# Patient Record
Sex: FEMALE | Race: WHITE | NOT HISPANIC OR LATINO
[De-identification: names, ages, dates, MRNs, and addresses within clinical notes are randomized per-mention and may not be internally consistent; named-entity substitution may affect disease eponyms.]

---

## 2022-02-27 ENCOUNTER — TRANSCRIPTION ENCOUNTER (OUTPATIENT)
Age: 27
End: 2022-02-27

## 2022-02-28 ENCOUNTER — INPATIENT (INPATIENT)
Facility: HOSPITAL | Age: 27
LOS: 0 days | Discharge: HOME | DRG: 417 | End: 2022-02-28
Attending: STUDENT IN AN ORGANIZED HEALTH CARE EDUCATION/TRAINING PROGRAM | Admitting: STUDENT IN AN ORGANIZED HEALTH CARE EDUCATION/TRAINING PROGRAM
Payer: COMMERCIAL

## 2022-02-28 ENCOUNTER — RESULT REVIEW (OUTPATIENT)
Age: 27
End: 2022-02-28

## 2022-02-28 VITALS
DIASTOLIC BLOOD PRESSURE: 49 MMHG | TEMPERATURE: 98 F | OXYGEN SATURATION: 100 % | SYSTOLIC BLOOD PRESSURE: 105 MMHG | RESPIRATION RATE: 19 BRPM | HEART RATE: 94 BPM

## 2022-02-28 VITALS
SYSTOLIC BLOOD PRESSURE: 130 MMHG | RESPIRATION RATE: 16 BRPM | WEIGHT: 190.04 LBS | HEIGHT: 62 IN | HEART RATE: 89 BPM | TEMPERATURE: 98 F | OXYGEN SATURATION: 97 % | DIASTOLIC BLOOD PRESSURE: 87 MMHG

## 2022-02-28 DIAGNOSIS — K80.21 CALCULUS OF GALLBLADDER WITHOUT CHOLECYSTITIS WITH OBSTRUCTION: ICD-10-CM

## 2022-02-28 LAB
ALBUMIN SERPL ELPH-MCNC: 4.2 G/DL — SIGNIFICANT CHANGE UP (ref 3.3–5.2)
ALP SERPL-CCNC: 71 U/L — SIGNIFICANT CHANGE UP (ref 40–120)
ALT FLD-CCNC: 32 U/L — SIGNIFICANT CHANGE UP
ANION GAP SERPL CALC-SCNC: 15 MMOL/L — SIGNIFICANT CHANGE UP (ref 5–17)
AST SERPL-CCNC: 27 U/L — SIGNIFICANT CHANGE UP
BASOPHILS # BLD AUTO: 0.03 K/UL — SIGNIFICANT CHANGE UP (ref 0–0.2)
BASOPHILS NFR BLD AUTO: 0.3 % — SIGNIFICANT CHANGE UP (ref 0–2)
BILIRUB SERPL-MCNC: 0.3 MG/DL — LOW (ref 0.4–2)
BLD GP AB SCN SERPL QL: SIGNIFICANT CHANGE UP
BUN SERPL-MCNC: 13.8 MG/DL — SIGNIFICANT CHANGE UP (ref 8–20)
CALCIUM SERPL-MCNC: 9.7 MG/DL — SIGNIFICANT CHANGE UP (ref 8.6–10.2)
CHLORIDE SERPL-SCNC: 100 MMOL/L — SIGNIFICANT CHANGE UP (ref 98–107)
CO2 SERPL-SCNC: 18 MMOL/L — LOW (ref 22–29)
CREAT SERPL-MCNC: 0.66 MG/DL — SIGNIFICANT CHANGE UP (ref 0.5–1.3)
EOSINOPHIL # BLD AUTO: 0.15 K/UL — SIGNIFICANT CHANGE UP (ref 0–0.5)
EOSINOPHIL NFR BLD AUTO: 1.4 % — SIGNIFICANT CHANGE UP (ref 0–6)
GLUCOSE SERPL-MCNC: 110 MG/DL — HIGH (ref 70–99)
HCG SERPL-ACNC: <4 MIU/ML — SIGNIFICANT CHANGE UP
HCT VFR BLD CALC: 42.9 % — SIGNIFICANT CHANGE UP (ref 34.5–45)
HGB BLD-MCNC: 14.6 G/DL — SIGNIFICANT CHANGE UP (ref 11.5–15.5)
IMM GRANULOCYTES NFR BLD AUTO: 0.4 % — SIGNIFICANT CHANGE UP (ref 0–1.5)
LIDOCAIN IGE QN: 29 U/L — SIGNIFICANT CHANGE UP (ref 22–51)
LYMPHOCYTES # BLD AUTO: 2.42 K/UL — SIGNIFICANT CHANGE UP (ref 1–3.3)
LYMPHOCYTES # BLD AUTO: 21.9 % — SIGNIFICANT CHANGE UP (ref 13–44)
MCHC RBC-ENTMCNC: 29.7 PG — SIGNIFICANT CHANGE UP (ref 27–34)
MCHC RBC-ENTMCNC: 34 GM/DL — SIGNIFICANT CHANGE UP (ref 32–36)
MCV RBC AUTO: 87.2 FL — SIGNIFICANT CHANGE UP (ref 80–100)
MONOCYTES # BLD AUTO: 0.54 K/UL — SIGNIFICANT CHANGE UP (ref 0–0.9)
MONOCYTES NFR BLD AUTO: 4.9 % — SIGNIFICANT CHANGE UP (ref 2–14)
NEUTROPHILS # BLD AUTO: 7.87 K/UL — HIGH (ref 1.8–7.4)
NEUTROPHILS NFR BLD AUTO: 71.1 % — SIGNIFICANT CHANGE UP (ref 43–77)
PLATELET # BLD AUTO: 298 K/UL — SIGNIFICANT CHANGE UP (ref 150–400)
POTASSIUM SERPL-MCNC: 4.2 MMOL/L — SIGNIFICANT CHANGE UP (ref 3.5–5.3)
POTASSIUM SERPL-SCNC: 4.2 MMOL/L — SIGNIFICANT CHANGE UP (ref 3.5–5.3)
PROT SERPL-MCNC: 7 G/DL — SIGNIFICANT CHANGE UP (ref 6.6–8.7)
RBC # BLD: 4.92 M/UL — SIGNIFICANT CHANGE UP (ref 3.8–5.2)
RBC # FLD: 12.8 % — SIGNIFICANT CHANGE UP (ref 10.3–14.5)
SARS-COV-2 RNA SPEC QL NAA+PROBE: DETECTED
SODIUM SERPL-SCNC: 132 MMOL/L — LOW (ref 135–145)
WBC # BLD: 11.05 K/UL — HIGH (ref 3.8–10.5)
WBC # FLD AUTO: 11.05 K/UL — HIGH (ref 3.8–10.5)

## 2022-02-28 PROCEDURE — 99285 EMERGENCY DEPT VISIT HI MDM: CPT

## 2022-02-28 PROCEDURE — 76705 ECHO EXAM OF ABDOMEN: CPT

## 2022-02-28 PROCEDURE — 86900 BLOOD TYPING SEROLOGIC ABO: CPT

## 2022-02-28 PROCEDURE — 47562 LAPAROSCOPIC CHOLECYSTECTOMY: CPT

## 2022-02-28 PROCEDURE — 83690 ASSAY OF LIPASE: CPT

## 2022-02-28 PROCEDURE — 36415 COLL VENOUS BLD VENIPUNCTURE: CPT

## 2022-02-28 PROCEDURE — 76705 ECHO EXAM OF ABDOMEN: CPT | Mod: 26,RT

## 2022-02-28 PROCEDURE — 88304 TISSUE EXAM BY PATHOLOGIST: CPT | Mod: 26

## 2022-02-28 PROCEDURE — U0003: CPT

## 2022-02-28 PROCEDURE — C1889: CPT

## 2022-02-28 PROCEDURE — U0005: CPT

## 2022-02-28 PROCEDURE — 85025 COMPLETE CBC W/AUTO DIFF WBC: CPT

## 2022-02-28 PROCEDURE — 96375 TX/PRO/DX INJ NEW DRUG ADDON: CPT

## 2022-02-28 PROCEDURE — 88304 TISSUE EXAM BY PATHOLOGIST: CPT

## 2022-02-28 PROCEDURE — 84702 CHORIONIC GONADOTROPIN TEST: CPT

## 2022-02-28 PROCEDURE — 86901 BLOOD TYPING SEROLOGIC RH(D): CPT

## 2022-02-28 PROCEDURE — 86850 RBC ANTIBODY SCREEN: CPT

## 2022-02-28 PROCEDURE — 80053 COMPREHEN METABOLIC PANEL: CPT

## 2022-02-28 PROCEDURE — 96374 THER/PROPH/DIAG INJ IV PUSH: CPT

## 2022-02-28 DEVICE — CLIP APPLIER COVIDIEN ENDOCLIP III 5MM: Type: IMPLANTABLE DEVICE | Status: FUNCTIONAL

## 2022-02-28 RX ORDER — ONDANSETRON 8 MG/1
4 TABLET, FILM COATED ORAL ONCE
Refills: 0 | Status: DISCONTINUED | OUTPATIENT
Start: 2022-02-28 | End: 2022-02-28

## 2022-02-28 RX ORDER — ONDANSETRON 8 MG/1
4 TABLET, FILM COATED ORAL ONCE
Refills: 0 | Status: COMPLETED | OUTPATIENT
Start: 2022-02-28 | End: 2022-02-28

## 2022-02-28 RX ORDER — KETOROLAC TROMETHAMINE 30 MG/ML
15 SYRINGE (ML) INJECTION ONCE
Refills: 0 | Status: DISCONTINUED | OUTPATIENT
Start: 2022-02-28 | End: 2022-02-28

## 2022-02-28 RX ORDER — IBUPROFEN 200 MG
600 TABLET ORAL EVERY 6 HOURS
Refills: 0 | Status: DISCONTINUED | OUTPATIENT
Start: 2022-02-28 | End: 2022-02-28

## 2022-02-28 RX ORDER — SODIUM CHLORIDE 9 MG/ML
1000 INJECTION, SOLUTION INTRAVENOUS
Refills: 0 | Status: DISCONTINUED | OUTPATIENT
Start: 2022-02-28 | End: 2022-02-28

## 2022-02-28 RX ORDER — MORPHINE SULFATE 50 MG/1
4 CAPSULE, EXTENDED RELEASE ORAL
Refills: 0 | Status: DISCONTINUED | OUTPATIENT
Start: 2022-02-28 | End: 2022-02-28

## 2022-02-28 RX ORDER — ACETAMINOPHEN 500 MG
975 TABLET ORAL EVERY 6 HOURS
Refills: 0 | Status: DISCONTINUED | OUTPATIENT
Start: 2022-02-28 | End: 2022-02-28

## 2022-02-28 RX ORDER — OXYCODONE HYDROCHLORIDE 5 MG/1
5 TABLET ORAL EVERY 4 HOURS
Refills: 0 | Status: DISCONTINUED | OUTPATIENT
Start: 2022-02-28 | End: 2022-02-28

## 2022-02-28 RX ADMIN — SODIUM CHLORIDE 110 MILLILITER(S): 9 INJECTION, SOLUTION INTRAVENOUS at 07:51

## 2022-02-28 RX ADMIN — MORPHINE SULFATE 4 MILLIGRAM(S): 50 CAPSULE, EXTENDED RELEASE ORAL at 14:34

## 2022-02-28 RX ADMIN — OXYCODONE HYDROCHLORIDE 5 MILLIGRAM(S): 5 TABLET ORAL at 15:32

## 2022-02-28 RX ADMIN — Medication 15 MILLIGRAM(S): at 05:44

## 2022-02-28 RX ADMIN — ONDANSETRON 4 MILLIGRAM(S): 8 TABLET, FILM COATED ORAL at 05:44

## 2022-02-28 RX ADMIN — Medication 15 MILLIGRAM(S): at 07:43

## 2022-02-28 RX ADMIN — MORPHINE SULFATE 4 MILLIGRAM(S): 50 CAPSULE, EXTENDED RELEASE ORAL at 14:04

## 2022-02-28 NOTE — H&P ADULT - ASSESSMENT
ASSESSMENT: Patient is a 26y old female with intractable biliary colic    PLAN:    - Admit to [facility] floor  - NPO/IVF  - Added on for OR  - pain control  - DVT ppx  - OOB/ambulate  - strict I/Os  - Plan discussed with Attending, Dr. Garcia

## 2022-02-28 NOTE — ED ADULT NURSE NOTE - ISOLATION TYPE:
Alert and oriented to person, place and time. Normal mood and affect, no apparent risk to self or others None Alert and orientedNormal mood and affect, no apparent risk to self or others

## 2022-02-28 NOTE — H&P ADULT - NSHPPHYSICALEXAM_GEN_ALL_CORE
--------------------------------------------------------------------------------------------  Vital Signs Last 24 Hrs  T(C): 36.8 (28 Feb 2022 07:22), Max: 36.8 (28 Feb 2022 07:22)  T(F): 98.2 (28 Feb 2022 07:22), Max: 98.2 (28 Feb 2022 07:22)  HR: 82 (28 Feb 2022 07:22) (82 - 89)  BP: 106/56 (28 Feb 2022 07:22) (106/56 - 130/87)  BP(mean): --  RR: 16 (28 Feb 2022 04:59) (16 - 16)  SpO2: 97% (28 Feb 2022 04:59) (97% - 97%)      PHYSICAL EXAM:   General: NAD, Lying in bed comfortably  Neuro: A+Ox3  HEENT: MMM  Cardio: RRR  Resp: Non labored breathing on RA  GI/Abd: Soft, nondistended, tender to palpation along upper abdomen, most tender in epigastrium, no rebound/guarding, no masses palpated  Vascular: All 4 extremities warm and well perfused.     --------------------------------------------------------------------------------------------

## 2022-02-28 NOTE — ED ADULT NURSE REASSESSMENT NOTE - NS ED NURSE REASSESS COMMENT FT1
p aaozx3 breathing on ra no sob no cp pt being evaluated for abd pain pt waiting for surgical consult lr infusing

## 2022-02-28 NOTE — ED PROVIDER NOTE - PROGRESS NOTE DETAILS
ANN Rosario: Still with pain. Deferred any other medication. Made aware of results. Surgery consulted.

## 2022-02-28 NOTE — ED PROVIDER NOTE - ATTENDING CONTRIBUTION TO CARE
Arianne: I performed a face to face bedside interview with patient regarding history of present illness, review of symptoms and past medical history. I completed an independent physical exam.  I have discussed patient's plan of care with advanced care provider.   I agree with note as stated above including HISTORY OF PRESENT ILLNESS, HIV, PAST MEDICAL/SURGICAL/FAMILY/SOCIAL HISTORY, ALLERGIES AND HOME MEDICATIONS, REVIEW OF SYSTEMS, PHYSICAL EXAM, MEDICAL DECISION MAKING and any PROGRESS NOTES during the time I functioned as the attending physician for this patient  unless otherwise noted. My brief assessment is as follows: Arianne CERVANTES: 25 yo female PMHx gallstones, LMP: 2/1 presents to ED c/o epigastric pain x2 hours. Associated with nausea without vomiting. Labs WNL, US with stone in gallbladder neck. Surgery consulted. Patient signed out by me pending surgery eval.

## 2022-02-28 NOTE — ED PROVIDER NOTE - CLINICAL SUMMARY MEDICAL DECISION MAKING FREE TEXT BOX
25 yo female PMHx gallstones, LMP: 2/1 presents to ED c/o epigastric pain x2 hours. Associated with nausea without vomiting. Labs WNL, US with stone in gallbladder neck. Surgery consulted. Arianne CERVANTES: 27 yo female PMHx gallstones, LMP: 2/1 presents to ED c/o epigastric pain x2 hours. Associated with nausea without vomiting. Labs WNL, US with stone in gallbladder neck. Surgery consulted. Patient signed out by me pending surgery eval.

## 2022-02-28 NOTE — ASU DISCHARGE PLAN (ADULT/PEDIATRIC) - NS MD DC FALL RISK RISK
For information on Fall & Injury Prevention, visit: [email or web address] OR  [email or web address] OR  https://www.cdc.gov/steadi/patient.html

## 2022-02-28 NOTE — ED PROVIDER NOTE - PHYSICAL EXAMINATION
General: In NAD, non-toxic appearing; well nourished/developed.  Skin: No rashes or lesions. Warm, dry, color normal for race. No jaundice appreciated.   Head: Normocephalic/atraumatic.  Eyes: Sclera anicteric, conjunctivae clear b/l. No discharge. PERRLA, EOMI.  Neck: Supple, FROM.   Resp: Breath sounds vesicular, symmetrical and without rales, rhonchi or wheezing b/l.  Abd: Non-distended. Soft, epigastric/RUQ TTP, no masses palpated. No rebound, guarding. No McBurney's point tenderness. Negative Borrero's sign.   MSK: MAEx4. FROM.   Neuro: A&Ox3. No motor/sensory deficits.

## 2022-02-28 NOTE — ASU DISCHARGE PLAN (ADULT/PEDIATRIC) - CARE PROVIDER_API CALL
Dago Garcia)  Surgery  General  [address] [place] [id number]  Phone: [phone]  Fax: [phone]  Follow Up Time: 2 weeks

## 2022-02-28 NOTE — BRIEF OPERATIVE NOTE - OPERATION/FINDINGS
Attempted to enter abdomen in supraumbilical incision but entered falciform ligament. Successfully entered abdomen with Optiview at Guardado's point, then upsized to 12mm. Diagnostic laparoscopy without significant findings. Gallbladder dissected from gallbladder fossa. Gallbladder wall injured with spillage of bile. Bile suctioned from wall injury and abdomen. Cystic duct identified and dissected. Clipped and cut. Had difficulty identifying the cystic artery. Structure initially thought to be artery turned out not to be. Dissected further until cystic artery identified. Artery clipped and transected. Gallbladder removed from cystic plate. Abdomen irrigated, hemostasis confirmed at conclusion of case. 12mm port site closed with vicryl and skin closed with monocryl.

## 2022-02-28 NOTE — ED PROVIDER NOTE - OBJECTIVE STATEMENT
27 yo female PMHx gallstones, LMP: 2/1 presents to ED c/o epigastric pain x2 hours. Sudden in onset, sharp, radiates around back. Associated with chest pain and nausea. Had fried chicken roll for dinner at 9pm. Self medicated with Pepcid PTA. Had US ordered by PCP, followed with GI specialist last week and has next follow up appointment Tuesday. No further complaints at this time.   Denies abdominal surgeries, fevers, SOB, v/d.

## 2022-02-28 NOTE — H&P ADULT - HISTORY OF PRESENT ILLNESS
HPI: 26y Female present to ED with abdominal pain. Patient states her pain started suddenly today at 3AM. Reports pain is very severe in epigastrium, but cannot give numerical rating. Pain also experienced in the back. Associated with N, chills and sweating during pain episodes. No vomiting, or fevers. States today her pain was also associated with chest pain and difficulty breathing but she attributes this to high anxiety and her symptoms have resolved now. She states her pain started initially in Dec 2020 was short lived and did not recur for 4-5 mos. Since that time, it has become increasingly frequent. Her prior episode was last week. She states she went to her PCP earlier this month and they did an US which showed stones. She was referred to GI for further evaluation. Has not     Consult called at: 0652  Consult seen at: 0658    ROS: 10-system review is otherwise negative except HPI above.      PAST MEDICAL & SURGICAL HISTORY:  Gallstones      FAMILY HISTORY:    Family history not pertinent as reviewed with the patient.    SOCIAL HISTORY:  Denies any toxic habits    ALLERGIES: NKA No Known Allergies

## 2022-02-28 NOTE — ASU DISCHARGE PLAN (ADULT/PEDIATRIC) - ASU DC SPECIAL INSTRUCTIONSFT
Wound care: Your wound has skin glue, which will fall off over the next week.  It is normal for there to be some bruising which can take up to 3-4 weeks to resolve.    Showering: You may shower starting tomorrow. Wash the incision with soapy water and rinse. Pat dry. Do not rub or scrub the incision.     Pain control: You should take Tylenol (daily maximum 4000mg) and/or Motrin every 6 hours for the next 24 hours and then as needed for pain. You can use a warm or cool compress for comfort as needed.

## 2022-02-28 NOTE — ED ADULT NURSE NOTE - OBJECTIVE STATEMENT
Assumed care of pt at 06:00. Pt A&Ox3 c/o URQ abdominal pain x2 years. Pt states she has experienced intermittent adnominal pain, was advised by her mother to go to the ED when she experienced the pain. Pt denies N/V/D/, endorses PMH of gallstones, currently being followed by GI on outpatient basis, endorses taking PO Famotidine PTA.

## 2022-02-28 NOTE — H&P ADULT - NSHPLABSRESULTS_GEN_ALL_CORE
LABS                 14.6   11.05  )----------(  298       ( 28 Feb 2022 05:39 )               42.9      132    |  100    |  13.8   ----------------------------<  110        ( 28 Feb 2022 05:39 )  4.2     |  18.0   |  0.66     Ca    9.7        ( 28 Feb 2022 05:39 )    TPro  7.0    /  Alb  4.2    /  TBili  0.3    /  DBili  x      /  AST  27     /  ALT  32     /  AlkPhos  71     ( 28 Feb 2022 05:39 )    LIVER FUNCTIONS - ( 28 Feb 2022 05:39 )  Alb: 4.2 g/dL / Pro: 7.0 g/dL / ALK PHOS: 71 U/L / ALT: 32 U/L / AST: 27 U/L / GGT: x             --------------------------------------------------------------------------------------------      IMAGING  < from: US Abdomen Upper Quadrant Right (02.28.22 @ 06:27) >    FINDINGS:    Liver: Hepatic steatosis  Bile ducts: Normal caliber. Common bile duct measures 3 mm.  Gallbladder: Gallstone impacted in the gallbladder neck. The gallbladder   is partially distended, with mural thicknessmeasuring the upper limit of   normal at 3 mm.  Pancreas: Sonographically unremarkable where visualized.  Right kidney: 10.4 cm. No hydronephrosis.  Ascites: None.  IVC: Visualized portions are normal in caliber.    IMPRESSION:      1. Gallstone impacted in the gallbladder neck, consider nuclear medicine   HIDA scan to evaluate for cystic duct obstruction.    < end of copied text >

## 2022-02-28 NOTE — ED ADULT TRIAGE NOTE - CHIEF COMPLAINT QUOTE
Ambulatory BIBEMS fro RUQ/epigastric pain, PMH gall stones. Reports nausea but denies vomiting/diarrhea/urinary complications.

## 2022-02-28 NOTE — H&P ADULT - ATTENDING COMMENTS
Seen and examined.   Patient reports already did research on topic. Went on to discuss surgical plan of laparoscopic, possible open, cholecystectomy. Discussed benefit of likely eliminating symptoms attributed to biliary colic, as well potential cholelithiasis sequelae - such as choledocholithiasis, cholangitis, hepatic injury, pancreatitis.   Also discussed risks of bleeding, infection, potential injury to intraabdominal structures.     Patient consents to proceed with procedure.

## 2022-03-04 NOTE — CHART NOTE - NSCHARTNOTEFT_GEN_A_CORE
Please note upon review of the chart the patient was found to have a diagnosis of COVID 19 infection based on PCR.

## 2022-03-07 PROBLEM — K80.20 CALCULUS OF GALLBLADDER WITHOUT CHOLECYSTITIS WITHOUT OBSTRUCTION: Chronic | Status: ACTIVE | Noted: 2022-02-28

## 2022-03-08 LAB — SURGICAL PATHOLOGY STUDY: SIGNIFICANT CHANGE UP

## 2022-03-14 PROBLEM — Z00.00 ENCOUNTER FOR PREVENTIVE HEALTH EXAMINATION: Status: ACTIVE | Noted: 2022-03-14

## 2022-03-15 ENCOUNTER — APPOINTMENT (OUTPATIENT)
Dept: TRAUMA SURGERY | Facility: CLINIC | Age: 27
End: 2022-03-15
Payer: MEDICAID

## 2022-03-15 VITALS
RESPIRATION RATE: 14 BRPM | HEIGHT: 63 IN | TEMPERATURE: 97.5 F | SYSTOLIC BLOOD PRESSURE: 100 MMHG | WEIGHT: 186 LBS | DIASTOLIC BLOOD PRESSURE: 67 MMHG | BODY MASS INDEX: 32.96 KG/M2 | HEART RATE: 79 BPM | OXYGEN SATURATION: 100 %

## 2022-03-15 DIAGNOSIS — Z90.49 ACQUIRED ABSENCE OF OTHER SPECIFIED PARTS OF DIGESTIVE TRACT: ICD-10-CM

## 2022-03-15 DIAGNOSIS — K80.50 CALCULUS OF BILE DUCT W/OUT CHOLANGITIS OR CHOLECYSTITIS W/OUT OBSTRUCTION: ICD-10-CM

## 2022-03-15 PROCEDURE — 99024 POSTOP FOLLOW-UP VISIT: CPT

## 2022-03-16 PROBLEM — K80.50 BILIARY COLIC: Status: ACTIVE | Noted: 2022-03-16

## 2022-03-16 PROBLEM — Z90.49 STATUS POST LAPAROSCOPIC CHOLECYSTECTOMY: Status: ACTIVE | Noted: 2022-03-16

## 2022-03-16 NOTE — PHYSICAL EXAM
[Normal Breath Sounds] : Normal breath sounds [Normal Heart Sounds] : normal heart sounds [Abdomen Tenderness] : ~T ~M No abdominal tenderness [No Rash or Lesion] : No rash or lesion [Purpura] : no purpura  [Petechiae] : no petechiae [Skin Ulcer] : no ulcer [Skin Induration] : no induration [Alert] : alert [Oriented to Person] : oriented to person [Oriented to Place] : oriented to place [Oriented to Time] : oriented to time [Calm] : calm [de-identified] : wdwn  female in  nad [de-identified] : Non icteric sclera PERRLA EOMS intact  and  nl [de-identified] : trachea  midline [de-identified] : soft  no  guarding  no  rebound  non tender no distension of  abdomen     all incision sites c/d/i  no  signs  of  cellulitis  no  formation of seroma  no  edema   no erythematous streaking

## 2022-03-16 NOTE — ASSESSMENT
[FreeTextEntry1] : RTC prn\par Diet modification \par No heavy lifting pulling or  pushing  for  4  weeks\par Discussion with patient   All questions answered  Any acute symptoms and or concerns patient understands  to call back office  and  or  go  directly to [facility] ED\par

## 2022-03-16 NOTE — HISTORY OF PRESENT ILLNESS
[FreeTextEntry1] : Patient presents  to [facility]  clinic for  post op exam s/p laparoscopic cholecystectomy  Patient  at  time  of  this exam  denies  any  fevers no chills  no  n/v/d   nl bm nl urination nl appetite  no  fatigue

## 2024-06-10 NOTE — ED PROVIDER NOTE - PRINCIPAL DIAGNOSIS
Preparing for Your Surgery      Name:  Lily Albert   MRN:  [id number]   :  2003   Today's Date:  6/10/2024       Arriving for surgery:  Surgery date:  2024  Arrival time:  10:00 AM    Please come to:     Please come to:       [facility] [address]   [address] [place]  [address]     The Green Ramp for patients and visitors is beneath the [facility]. The parking facility entrance is at the intersection of [address] [place] [address] and [address]. Patients and visitors who self-park will receive the reduced hospital parking rate (no ticket validation needed).     Worklight parking, located at the [facility] main entrance on [address] [place] [address], is available Monday - Friday from 7 am to 3:30 pm.     Discounted parking pass options can be purchased from  attendants during business hours.     -Check in at the security desk in the [facility] ([facility])   Lobby. They will direct you to the correct elevators.   -Proceed to the 3rd floor, Adult Surgery Waiting Lounge. [phone]     If you need directions, a wheelchair or escort please stop at the Information Desk in the lobby.  Inform the information person you are here for surgery; a wheelchair and escort to [address] will be provided.   An escort to the Adult Surgery Waiting Lounge will be provided. .    What can I eat or drink?  -  You may eat and drink normally up to 8 hours prior to arrival time. (Until 2:00 AM)  -  You may have clear liquids until 2 hours prior to arrival time. (Until 8:00 AM)    Examples of clear liquids:  Water  Clear broth  Juices (apple, white grape, white cranberry  and cider) without pulp  Noncarbonated, powder based beverages  (lemonade and Ronnie-Aid)  Sodas (Sprite, 7-Up, ginger ale and seltzer)  Coffee or tea (without milk or cream)  Gatorade    -  No Alcohol or cannabis products for  at least 24 hours before surgery.     Which medicines can I take?    Hold Aspirin for 7 days before surgery.   **Hold Multivitamins for 7 days before surgery.  Hold Supplements for 7 days before surgery.  **Hold Ibuprofen (Advil, Motrin) for 1 day(s) before surgery--unless otherwise directed by surgeon.  Hold Naproxen (Aleve) for 4 days before surgery.  **Hold Excedrin for 7 days before surgery.    -  DO NOT take these medications the day of surgery:  Zyrtec  Losartan  Doculax  Senokot  Calcium - Vitamin D  Magnesium Oxide  Concerta  Vitamin D  Riboflavin  Nurtec  Levsin    -  PLEASE TAKE these medications the day of surgery:  Tylenol if needed  Gabapentin  Zofran if needed  Propanolol if needed    **Can take evening medication(s) as scheduled. (Lexapro)    How do I prepare myself?  - Please take 2 showers (one the night prior to surgery and one the morning of surgery) using Scrubcare or Hibiclens soap.    Use this soap only from the neck to your toes.     Leave the soap on your skin for one minute--then rinse thoroughly.      You may use your own shampoo and conditioner. No other hair products.   - Please remove all jewelry and body piercings.  - No lotions, deodorants or fragrance.  - No makeup or fingernail polish.   - Bring your ID and insurance card.    -If you use a CPAP machine, please bring the CPAP machine, tubing, and mask to hospital.    -If you have a Deep Brain Stimulator, Spinal Cord Stimulator, or any Neuro Stimulator device---you must bring the remote control to the hospital.      ALL PATIENTS GOING HOME THE SAME DAY OF SURGERY ARE REQUIRED TO HAVE A RESPONSIBLE ADULT TO DRIVE AND BE IN ATTENDANCE WITH THEM FOR 24 HOURS FOLLOWING SURGERY.    Covid testing policy as of 12/06/2022  Your surgeon will notify and schedule you for a COVID test if one is needed before surgery--please direct any questions or COVID symptoms to your surgeon      Questions or Concerns:    - For any questions regarding the day  of surgery or your hospital stay, please contact the Pre Admission Nursing Office at [phone].       - If you have health changes between today and your surgery, please call your surgeon.       - For questions after surgery, please call your surgeons office.           Current Visitor Guidelines    You may have 2 visitors in the pre op area.    Visiting hours: 8 a.m. to 8:30 p.m.    Patients confirmed or suspected to have symptoms of COVID 19 or flu:     No visitors allowed for adult patients.   Children (under age 18) can have 1 named visitor.     People who are sick or showing symptoms of COVID 19 or flu:    Are not allowed to visit patients--we can only make exceptions in special situations.       Please follow these guidelines for your visit:          Please maintain social distance          Masking is optional--however at times you may be asked to wear a mask for the safety of yourself and others     Clean your hands with alcohol hand . Do this when you arrive at and leave the building and patient room,    And again after you touch your mask or anything in the room.     Go directly to and from the room you are visiting.     Stay in the patient s room during your visit. Limit going to other places in the hospital as much as possible     Leave bags and jackets at home or in the car.     For everyone s health, please don t come and go during your visit. That includes for smoking   during your visit.            Gallstones with obstruction of gallbladder

## (undated) DEVICE — ENDOCATCH 10MM SPECIMEN POUCH

## (undated) DEVICE — ELCTR CORD FOOTSWITCH 1PLR LAPSCP 10FT

## (undated) DEVICE — TROCAR COVIDIEN VERSAONE BLUNT TIP HASSAN 12MM

## (undated) DEVICE — WRAP COMPRESSION CALF MED

## (undated) DEVICE — SUT VICRYL 0 27" CT-2 UNDYED

## (undated) DEVICE — TROCAR COVIDIEN VERSAONE OPTICAL BLADELESS 5MM

## (undated) DEVICE — PACK GENERAL LAPAROSCOPY

## (undated) DEVICE — LIGASURE MARYLAND JAW LAPAROSCOPIC SEALER 37CM

## (undated) DEVICE — SYR CONTROL LUER LOK 10CC

## (undated) DEVICE — SUT MONOCRYL 4-0 27" PS-2 UNDYED

## (undated) DEVICE — GLV 7.5 PROTEXIS

## (undated) DEVICE — TUBING STRYKEFLOW II SUCTION / IRRIGATOR

## (undated) DEVICE — BLANKET WARMER UPPER ADULT